# Patient Record
Sex: FEMALE | Race: WHITE
[De-identification: names, ages, dates, MRNs, and addresses within clinical notes are randomized per-mention and may not be internally consistent; named-entity substitution may affect disease eponyms.]

---

## 2020-05-26 ENCOUNTER — HOSPITAL ENCOUNTER (EMERGENCY)
Dept: HOSPITAL 8 - ED | Age: 30
Discharge: HOME | End: 2020-05-26
Payer: MEDICAID

## 2020-05-26 VITALS — DIASTOLIC BLOOD PRESSURE: 59 MMHG | SYSTOLIC BLOOD PRESSURE: 112 MMHG

## 2020-05-26 VITALS — WEIGHT: 197.98 LBS | HEIGHT: 69 IN | BODY MASS INDEX: 29.32 KG/M2

## 2020-05-26 DIAGNOSIS — Z90.721: ICD-10-CM

## 2020-05-26 DIAGNOSIS — R50.9: ICD-10-CM

## 2020-05-26 DIAGNOSIS — N93.9: ICD-10-CM

## 2020-05-26 DIAGNOSIS — F17.200: ICD-10-CM

## 2020-05-26 DIAGNOSIS — R11.0: ICD-10-CM

## 2020-05-26 DIAGNOSIS — Z90.49: ICD-10-CM

## 2020-05-26 DIAGNOSIS — Z90.89: ICD-10-CM

## 2020-05-26 DIAGNOSIS — N30.00: Primary | ICD-10-CM

## 2020-05-26 DIAGNOSIS — R10.33: ICD-10-CM

## 2020-05-26 DIAGNOSIS — R10.31: ICD-10-CM

## 2020-05-26 DIAGNOSIS — R10.32: ICD-10-CM

## 2020-05-26 DIAGNOSIS — N93.8: ICD-10-CM

## 2020-05-26 LAB
<PLATELET ESTIMATE>: ADEQUATE
ALBUMIN SERPL-MCNC: 2.8 G/DL (ref 3.4–5)
ALP SERPL-CCNC: 174 U/L (ref 45–117)
ALT SERPL-CCNC: 72 U/L (ref 12–78)
ANION GAP SERPL CALC-SCNC: 7 MMOL/L (ref 5–15)
ANISOCYTOSIS BLD QL SMEAR: (no result)
BAND#(MANUAL): 0.95 X10^3/UL
BILIRUB SERPL-MCNC: 0.3 MG/DL (ref 0.2–1)
CALCIUM SERPL-MCNC: 8 MG/DL (ref 8.5–10.1)
CHLORIDE SERPL-SCNC: 112 MMOL/L (ref 98–107)
CREAT SERPL-MCNC: 0.8 MG/DL (ref 0.55–1.02)
EOS#(MANUAL): 0.11 X10^3/UL (ref 0–0.4)
EOS% (MANUAL): 1 % (ref 1–7)
ERYTHROCYTE [DISTWIDTH] IN BLOOD BY AUTOMATED COUNT: 18.5 % (ref 9.6–15.2)
LG PLATELETS BLD QL SMEAR: (no result)
LYMPH#(MANUAL): 2.21 X10^3/UL (ref 1–3.4)
LYMPHS% (MANUAL): 21 % (ref 22–44)
MCH RBC QN AUTO: 24.9 PG (ref 27–34.8)
MCHC RBC AUTO-ENTMCNC: 32.4 G/DL (ref 32.4–35.8)
MCV RBC AUTO: 76.8 FL (ref 80–100)
MD: YES
MICROCYTES BLD QL SMEAR: (no result)
MICROSCOPIC: (no result)
MONOS#(MANUAL): 0.11 X10^3/UL (ref 0.3–2.7)
MONOS% (MANUAL): 1 % (ref 2–9)
NEUTS BAND NFR BLD: 9 % (ref 0–7)
OVALOCYTES BLD QL SMEAR: (no result)
PLATELET # BLD AUTO: 151 X10^3/UL (ref 130–400)
PMV BLD AUTO: 12.4 FL (ref 7.4–10.4)
PROT SERPL-MCNC: 6.9 G/DL (ref 6.4–8.2)
RBC # BLD AUTO: 4.17 X10^6/UL (ref 3.82–5.3)
SEG#(MANUAL): 7.14 X10^3/UL (ref 1.8–6.8)
SEGS% (MANUAL): 68 % (ref 42–75)

## 2020-05-26 PROCEDURE — 76830 TRANSVAGINAL US NON-OB: CPT

## 2020-05-26 PROCEDURE — 83605 ASSAY OF LACTIC ACID: CPT

## 2020-05-26 PROCEDURE — 99285 EMERGENCY DEPT VISIT HI MDM: CPT

## 2020-05-26 PROCEDURE — 81001 URINALYSIS AUTO W/SCOPE: CPT

## 2020-05-26 PROCEDURE — 85025 COMPLETE CBC W/AUTO DIFF WBC: CPT

## 2020-05-26 PROCEDURE — 87040 BLOOD CULTURE FOR BACTERIA: CPT

## 2020-05-26 PROCEDURE — 87086 URINE CULTURE/COLONY COUNT: CPT

## 2020-05-26 PROCEDURE — 36415 COLL VENOUS BLD VENIPUNCTURE: CPT

## 2020-05-26 PROCEDURE — 74177 CT ABD & PELVIS W/CONTRAST: CPT

## 2020-05-26 PROCEDURE — 80053 COMPREHEN METABOLIC PANEL: CPT

## 2020-05-26 NOTE — NUR
FIRST CONTACT WITH PT. PT HERE FOR "LADY PART PAIN" PT REPORTS SHE FEELS 
SOMETHING BELOW PELVIS BUT ABOVE VAGIANL AREA. PT REPORTS SHE FEELS SWOLLEN 
DOWN THERE AND NOT WELL. PT REPORTS SHE WAS SEEN REN[place]  1 MONTH AGO. REPORTS 
HAVING FEVERS ON SATURDAY 102.6 @1320PM. PT'S AOX4. RESPS EVEN AND UNLABORED. 
PT STATES "I'VE BEEN BLEEDONG SINCE NOV." BP/SPO2 MONITORS IN PLACE. CALL LIGHT 
WITHIN REACH. PA AT BEDSIDE TO EVALUATE AT THIS TIME.